# Patient Record
Sex: FEMALE | Race: WHITE | NOT HISPANIC OR LATINO | Employment: OTHER | URBAN - METROPOLITAN AREA
[De-identification: names, ages, dates, MRNs, and addresses within clinical notes are randomized per-mention and may not be internally consistent; named-entity substitution may affect disease eponyms.]

---

## 2024-06-27 ENCOUNTER — OFFICE VISIT (OUTPATIENT)
Dept: NEUROLOGY | Facility: HOSPITAL | Age: 34
End: 2024-06-27
Payer: COMMERCIAL

## 2024-06-27 ENCOUNTER — APPOINTMENT (OUTPATIENT)
Dept: NEUROLOGY | Facility: HOSPITAL | Age: 34
End: 2024-06-27

## 2024-06-27 VITALS
HEIGHT: 72 IN | RESPIRATION RATE: 16 BRPM | BODY MASS INDEX: 18.96 KG/M2 | WEIGHT: 140 LBS | SYSTOLIC BLOOD PRESSURE: 123 MMHG | HEART RATE: 52 BPM | DIASTOLIC BLOOD PRESSURE: 81 MMHG

## 2024-06-27 DIAGNOSIS — R20.0 NUMBNESS AND TINGLING: Primary | ICD-10-CM

## 2024-06-27 DIAGNOSIS — G90.A POTS (POSTURAL ORTHOSTATIC TACHYCARDIA SYNDROME): ICD-10-CM

## 2024-06-27 DIAGNOSIS — R20.2 NUMBNESS AND TINGLING: Primary | ICD-10-CM

## 2024-06-27 PROCEDURE — 99215 OFFICE O/P EST HI 40 MIN: CPT | Performed by: PSYCHIATRY & NEUROLOGY

## 2024-06-27 PROCEDURE — 99417 PROLNG OP E/M EACH 15 MIN: CPT | Performed by: PSYCHIATRY & NEUROLOGY

## 2024-06-27 PROCEDURE — 99205 OFFICE O/P NEW HI 60 MIN: CPT | Performed by: PSYCHIATRY & NEUROLOGY

## 2024-06-27 RX ORDER — DULOXETIN HYDROCHLORIDE 30 MG/1
30 CAPSULE, DELAYED RELEASE ORAL DAILY
COMMUNITY

## 2024-06-27 RX ORDER — OXYBUTYNIN CHLORIDE 5 MG/1
5 TABLET ORAL 2 TIMES DAILY
COMMUNITY

## 2024-06-27 RX ORDER — LIDOCAINE 50 MG/G
OINTMENT TOPICAL AS NEEDED
COMMUNITY

## 2024-06-27 RX ORDER — OMEPRAZOLE 20 MG/1
20 CAPSULE, DELAYED RELEASE ORAL
COMMUNITY

## 2024-06-27 RX ORDER — ONDANSETRON 4 MG/1
4 TABLET, ORALLY DISINTEGRATING ORAL EVERY 8 HOURS PRN
COMMUNITY

## 2024-06-27 RX ORDER — METOPROLOL SUCCINATE 25 MG/1
25 TABLET, EXTENDED RELEASE ORAL DAILY
COMMUNITY

## 2024-06-27 ASSESSMENT — PAIN SCALES - GENERAL: PAINLEVEL: 7

## 2024-06-27 NOTE — PATIENT INSTRUCTIONS
We need to do an extensive workup to find a cause for your autonomic dysfunction.     Please get your blood drawn and urine test.     Call [phone] to reach the Autonomic Testing/EMG scheduling staff.    We will try to arrange for a lip biopsy with ENT, looking for Sjogren's disease.

## 2024-06-27 NOTE — PROGRESS NOTES
[facility] AUTONOMIC PROGRAM         Nadia Mullins MD    Professor of Neurology  [facility]  Senior Attending Physician- [facility]  Director, Autonomic Program and Laboratories  Medical Director, [place] [address]  Office: [phone]  Assistant: Carmita Gibbs (email: [email or web address])       AUTONOMIC NERVOUS SYSTEM CONSULTATION    Patient Information     Medical Record Number: [id number]   YOB: 1990    Home Address: [address] [place] [id number]   Phone Number:  [phone]      Primary Care Physician: No primary care provider on file.    Referring Physician: No referring provider defined for this encounter.    Patient accompanied by:    In addition to attending physician, patient seen by: Tushar Luna MD    Clinical Scores    CLINICAL SCORES    [facility] 31 : 72      IMPRESSION:  Jyothi Genao presents with orthostatic intolerance since childhood along with numerous syncopal events.  Many of which are complex involving shaking and other abnormal behaviors.  She has undergone MRI and EEG she is reported normal.  She continues to have orthostatic lightheadedness along with syncope currently.  She also suffers from numerous other issues including sicca symptoms, migraines, gastroparesis, diarrhea and constipation alternating, celiac disease diagnosed by biopsy and by antibody, none length-dependent small fiber neuropathy diagnosed by skin biopsy (abnormal thigh segment, with normal calf segment), hypermobile EDS, mitral valve prolapse and frequent PVCs with palpitations on metoprolol, as well as other nonspecific symptoms.     She was then diagnosed with POTS by tilt table in [place] on, and was noted to be very hypertensive when upright, suggesting hyperadrenergic POTS.  Will pursue the remaining workup that is needed for her including  EMG, and lip biopsy looking for Sjogren's disease, along with other causes of POTS and dysautonomia.    She is here from out of town traveling from [facility] today, we will arrange the best we can for diagnostic testing and lip biopsy with ENT while she was in town.    PLAN/RECOMMENDATIONS:   Problem List Items Addressed This Visit       Numbness and tingling    -  Primary    Relevant Orders    Referral to ENT    EMG & nerve conduction    POTS (postural orthostatic tachycardia syndrome)        Relevant Orders    [place] DYS2 panel; [facility]; DYS2 - Miscellaneous Test    Thyroid Peroxidase (TPO) Antibody    C-Reactive Protein    Carnitine Panel, Blood    Catecholamines, Fractionated, Plasma    Anti-Thyroglobulin Antibody    RADHA + ERON Panel    Thyroxine, Total    Triiodothyronine, Free    Sedimentation Rate    Rheumatoid Factor    Serum Protein Electrophoresis + Immunofixation    Anti-Parietal Cell Antibody    Paraneoplastic Autoantibodies Evaluation    Mycoplasma Pneumoniae Antibody, IgM    Mycoplasma Pneumoniae Antibody, IgG    Homocysteine    Glucose Tolerance Test, 3 hour (Non-Pregnancy)    Cryoglobulin    Copper, Blood    Coenzyme Q10    Ceruloplasmin    Celiac Panel    Methylmalonic Acid    Aldolase    Vitamin B6    Vitamin E    Copper, Urine    Metanephrines, Fractionated, Urine (24 hour or Random)    Organic Acids, Urine    Porphobilinogen, 24 Hour Urine    Aminolevulinic Acid (ALA), Urine    Urine Protein Electrophoresis + Immunofixation    [place]/Lambda Free Light Chain, Serum    Voltage-Gated Calcium Channel P/Q type and N-type (VGCC) Antibody Panel; [facility]; 6834123 - Miscellaneous Test    Early Sjögren’s Syndrome Profile (SP-1, CA-6, PSP - IgG, IgA, IgM); [facility]); 76448 - Miscellaneous Test    Referral to ENT    Autonomic Testing     Tushar Luna MD  Neuromuscular Fellow        HPI    Jyothi Genao is a 34 y.o. y/o right}-handed /Turkmen/English  "female presenting to the  Autonomic Program for the evaluation of dysautonomia.     History details   PVCs in childhood. At age 12 years old she was said to have orthostatic hypotension.     About 10 years ago she started having fainting spells (blamed on electrolyte problems). Orthostatic hypotension.     Would collapse suddenly and tremor ing \"seizure like\". She would be confused and repeat words or non sense words. This occurring a few times per year. Slightly different each time -- blurring and blacking of vision, followed by . She gets burnt smells, or metallic taste in her mouth - occurring weekly.  She had a 20 min and 1 hour EEG negative. MRI brain normal.     Sometimes she gets full body tremoring with preserved consciousness.     She also has migraines, starting at 16 years old. Pain in the temples, up the back of the skull, behind eyes. Vice like/squeezing, better lying down. Photophobia/phonophobia, nausea. Constant pain (heaviness) over the shoulder - worse standing up. She has this daily. She has tried many meds.  Botox only helps a little.     Epilepsy workup negative, but has hypersomnia, RLS, parasomnia.     Recently hypertension (OH in past).     GI: Gastroparesis started at 11 yo, daily nausea, vomiting often. GERD, bloating, postprandial fatigue. Diarrhea and constipation. Up to 17 days without a bowel movement.     Celiac disease diagnosed (biopsy+ and Ab), tried gluten free and FODMAP, helped alittle.     Small fiber neuropathy - mild since childhood. She has numbness and tingling, pain, and coldness in her hands, feet, and sometimes face. It is symmetric. Skin biopsy: c/w SFN, (thigh ABN, calf NL). Worse in last couple years.     hEDS - hypermobile joints, paternal grandfather atypical marfan MV prolaspe.     Cardaic - MV prolapse with regurgitation, other valves too. Frequent PVCs off beta blocker.     Vertigo and hearing loss. ENT said she has an auditory processing disorder with     Voice " problems, she has poor volume of voice, and hoarseness. Sometimes losses voice.     Dry eye, mouth, grey teeth, and several cavities.     In 2019 tilt was normal with PVC    Tilt repeated 4/17/2024 and c/w POTS, however hypertensive when supine.     She has seen multiple neurologists. Right now just for migraine. Never seen an autonomic specialist. Cardiology for autonomic problems.     Recent labs:  B12 600s   B6 toxic high  Metabolic, CBC, A1c, iron studies, TSH normal  Ferritin high  Low folate    Relevant Past Medical History:  Extensive discussion above    Relevant Past Surgical History:  Palate surgeries for abnormal teeth  Tonsils out    Relevant Family History:   Mother has HTN and stroke  Father vitiligo     AUTONOMIC REVIEW OF SYSTEMS    COMPASS 31 for research purposes only (see below)  1 1. In the past year, have you ever felt faint, dizzy, “goofy”, or had difficulty thinking soon after standing up from a sitting or lying position? Yes   3 2. When standing up, how frequently do you get these feelings or symptoms? Almost Always   3 3. How would you rate the severity of these feelings or symptoms? Severe   1 4. In the past year, have these feelings or symptoms that you have experienced: Stayed about the same   8     32     1 5. In the past year, have you ever noticed color changes in your skin, such as red, white, or purple? (If you answer no, please skip to question 8) Yes   2 6. What parts of your body are affected by these color changes? Hands and Feet   2 7. Have these changes in your skin color: Gotten somewhat worse   5     4     1 8. In the past 5 years, what changes, if any, have occurred in your general body sweating? I sweat much more than I used to   1 9. Do your eyes feel excessively dry? Yes   1 10. Does you mouth feel excessively dry? Yes   1 11. For the symptom of dry eyes or dry mouth that you have had for the longest period of time, has this symptom: Stayed about the same   4     9     1  12. In the past year, have you noticed any changes in how quickly you get full when eating a meal? I get full more quickly now than I used to   2 13. In the past year, have you felt excessively full or persistently full (bloated feeling) after a meal? A lot of the time   2 14. In the past year, have you vomited after a meal? A lot of the time   2 15. In the past year, have you had a cramping or colicky abdominal pain? A lot of the time   1 16. In the past year, have you had any bouts of diarrhea? (If you answer no, please skip to question 20) Yes   2 17. How frequently does this occur? Frequently   2 18. How severe are these bouts of diarrhea? Moderate   1 19. Have your bouts of diarrhea gotten: Stayed about the same   1 20. In the past year, have you been constipated? (If you answer no, please skip to question 24) Yes   2 21. How frequently are you constipated? Frequently   3 22. How severe are these episodes of constipation? Severe   0 23. Has your constipation gotten: Gotten somewhat better   19     17     2 24. In the past year, have you ever lost control of your bladder function? Frequently   1 25. In the past year, have you had difficulty passing urine? Occasionally   2 26. In the past year, have you had trouble completely emptying your bladder? Frequently   5     6     3 27. In the past year, without sunglasses or tinted glasses, has bright light bothered your eyes? (If you amelia never, please skip to question 29) Constantly   3 28. How severe is this sensitivity to bright light? Severe   2 29. In the past year, have you had trouble focusing your eyes? (If you amelia never, please skip to question 31) Frequently   3 30. How severe is this focusing problem? Severe   2 31. Has the most troublesome symptom with your eyes (i.e. sensitivity to bright light or trouble focusing) gotten: Gotten somewhat worse   13     [id number]          TOTAL     72 /100           Additional Autonomic Review of  Systems    Genitourinary    a. Erectile dysfunction N/A  b. Ejaculation dysfunction N/A  c. Vaginal dryness N/A  d. Difficulty climaxing N/A    Autoimmune symptoms    a. Chronic joint pain Yes  b. Chronic skin disease Yes  c. Chronic muscle pain Yes    GENERAL EXAMINATION    Overall appearance: NAD, well-nourished, well-kempt, and pleasant  Extremities: normal    NEUROLOGICAL EXAMINATION    A. Cognition and Language  1. Patient is alert, oriented to time, place and persons  2. Language normal  3. Dysarthria: not present  4. Memory: no apparent deficit  5. MMSE: N/A    B. Cranial Nerves  1. Fundi: not examined  2. Olfaction: not tested  3. Eye movements: EOMI   4. Pupils: PERRLA  5. Facial sensation: normal  6. Facial motor: normal  7. Hearing: normal bilaterally  8. Palate: elevates symmetrically bilaterally  9. SCM: normal strength  10. Tongue: midline    C. Motor examination (MRC [0 to 5] or modified MRC [pluses and minuses] classification)  1. Neck flexion :   2. Neck extension:   3. Eye closure:      4. Lip closure:      5. Shoulder elevation:   Right  normal Left: normal  6. Deltoids:    Right:  5/5  Left: 5/5  7. Biceps:    Right: 5/5  Left: 5/5  8. Triceps:    Right: 5/5  Left: 5/5  9. Forearm pronation:   Right: 5/5 Left: 5/5  10. Forearm supination:   Right:  5/5 Left: 5/5  11. Wrist flexion:    Right: 5/5 Left: 5/5  12. Wrist extension:   Right : 5/5 Left: 5/5  13. Intrinsic Hand Muscles:  Right:  5/5 Left: 5/5  14.  Hip flexion   Right: 5/5 Left: 5/5  15.  Hip extension   Right: 5/5 Left: 5/5  16.  Knee flexion   Right: 5/5 Left: 5/5  17. Knee extension  Right: 5/5 Left: 5/5  18. Dorsiflexion   Right: 5/5 Left: 5/5  19.  Plantar flexion   Right: 5/5 Left: 5/5  20. Toe extension   Right: 5/5 Left: 5/5  21. Toe flexion   Right: 5/5 Left: 5/5      D. Sensory examination  In the upper extremity there is reduced pinprick over a patch of the lower arm and mid forearm with return to normal sensation in the  wrist, and allodynia in the hands and fingers bilaterally.  In the lower extremity there is patchy reduced sensation around the calf with return of normal sensation and allodynia in the feet bilaterally.  Proprioception and vibration sense is normal.    E. Reflexes (NINDS classification 0 to 4)    Biceps:   Right 3 Left 3  Triceps:   Right 3 Left 3  BR:    Right + Left +  Knee:    Right 3 Left 3  Ankles:   Right 3 Left 3    Additional reflexes if relevant (Absent or Present):     Babinski: down going bilateral     F. Coordination    F to N: Normal  H to S:  FRANKLIN:   FFM: Normal    G. Gait    Patient felt lightheaded upon standing-up from supine position: Yes    Normal gait  Normal base  Romberg test: Normal      Attending's Note:   I saw the patient with Dr. Luna. I agree with Dr. Luna's impression and recommendations/plan. We discussed management of this patient together and I would not have more to add to the above.     I spent 90 minutes with the patient, at least 50% of which were dedicated to education and detailing diagnostic plans and management.      Nadia Mullins MD

## 2024-07-01 ENCOUNTER — LAB (OUTPATIENT)
Dept: LAB | Facility: LAB | Age: 34
End: 2024-07-01
Payer: COMMERCIAL

## 2024-07-01 ENCOUNTER — HOSPITAL ENCOUNTER (OUTPATIENT)
Dept: NEUROLOGY | Facility: CLINIC | Age: 34
Discharge: HOME | End: 2024-07-01
Payer: COMMERCIAL

## 2024-07-01 DIAGNOSIS — R20.0 NUMBNESS AND TINGLING: ICD-10-CM

## 2024-07-01 DIAGNOSIS — G90.A POTS (POSTURAL ORTHOSTATIC TACHYCARDIA SYNDROME): ICD-10-CM

## 2024-07-01 DIAGNOSIS — R20.2 NUMBNESS AND TINGLING: ICD-10-CM

## 2024-07-01 LAB
CENTROMERE B AB SER-ACNC: <0.2 AI
CERULOPLASMIN SERPL-MCNC: 22.6 MG/DL (ref 20–60)
CHROMATIN AB SERPL-ACNC: <0.2 AI
CRP SERPL-MCNC: <0.1 MG/DL
DSDNA AB SER-ACNC: <1 IU/ML
ENA JO1 AB SER QL IA: <0.2 AI
ENA RNP AB SER IA-ACNC: 0.2 AI
ENA SCL70 AB SER QL IA: <0.2 AI
ENA SM AB SER IA-ACNC: <0.2 AI
ENA SM+RNP AB SER QL IA: <0.2 AI
ENA SS-A AB SER IA-ACNC: <0.2 AI
ENA SS-B AB SER IA-ACNC: <0.2 AI
ERYTHROCYTE [SEDIMENTATION RATE] IN BLOOD BY WESTERGREN METHOD: <1 MM/H (ref 0–20)
GLIADIN PEPTIDE IGA SER IA-ACNC: <1 U/ML
GLUCOSE 1H P 75 G GLC PO SERPL-MCNC: 117 MG/DL
GLUCOSE 2H P 75 G GLC PO SERPL-MCNC: 84 MG/DL
GLUCOSE 3H P 75 G GLC PO SERPL-MCNC: 76 MG/DL
GLUCOSE P FAST SERPL-MCNC: 83 MG/DL
HCYS SERPL-SCNC: 12.33 UMOL/L (ref 5–13.9)
PROT SERPL-MCNC: 7.6 G/DL (ref 6.4–8.2)
RHEUMATOID FACT SER NEPH-ACNC: <10 IU/ML (ref 0–15)
RIBOSOMAL P AB SER-ACNC: <0.2 AI
T3FREE SERPL-MCNC: 3.7 PG/ML (ref 2.3–4.2)
T4 SERPL-MCNC: 7.5 UG/DL (ref 4.5–11.1)
THYROPEROXIDASE AB SERPL-ACNC: 302 IU/ML
TTG IGA SER IA-ACNC: <1 U/ML

## 2024-07-01 PROCEDURE — 82951 GLUCOSE TOLERANCE TEST (GTT): CPT

## 2024-07-01 PROCEDURE — 83921 ORGANIC ACID SINGLE QUANT: CPT

## 2024-07-01 PROCEDURE — 86738 MYCOPLASMA ANTIBODY: CPT

## 2024-07-01 PROCEDURE — 82390 ASSAY OF CERULOPLASMIN: CPT

## 2024-07-01 PROCEDURE — 82542 COL CHROMOTOGRAPHY QUAL/QUAN: CPT

## 2024-07-01 PROCEDURE — 95912 NRV CNDJ TEST 11-12 STUDIES: CPT | Performed by: PSYCHIATRY & NEUROLOGY

## 2024-07-01 PROCEDURE — 84165 PROTEIN E-PHORESIS SERUM: CPT

## 2024-07-01 PROCEDURE — 86320 SERUM IMMUNOELECTROPHORESIS: CPT | Performed by: PSYCHIATRY & NEUROLOGY

## 2024-07-01 PROCEDURE — 86235 NUCLEAR ANTIGEN ANTIBODY: CPT

## 2024-07-01 PROCEDURE — 83516 IMMUNOASSAY NONANTIBODY: CPT

## 2024-07-01 PROCEDURE — 82595 ASSAY OF CRYOGLOBULIN: CPT

## 2024-07-01 PROCEDURE — 36415 COLL VENOUS BLD VENIPUNCTURE: CPT

## 2024-07-01 PROCEDURE — 83521 IG LIGHT CHAINS FREE EACH: CPT

## 2024-07-01 PROCEDURE — 95886 MUSC TEST DONE W/N TEST COMP: CPT | Performed by: PSYCHIATRY & NEUROLOGY

## 2024-07-01 PROCEDURE — 84436 ASSAY OF TOTAL THYROXINE: CPT

## 2024-07-01 PROCEDURE — 83090 ASSAY OF HOMOCYSTEINE: CPT

## 2024-07-01 PROCEDURE — 86140 C-REACTIVE PROTEIN: CPT

## 2024-07-01 PROCEDURE — 86038 ANTINUCLEAR ANTIBODIES: CPT

## 2024-07-01 PROCEDURE — 84207 ASSAY OF VITAMIN B-6: CPT

## 2024-07-01 PROCEDURE — 86431 RHEUMATOID FACTOR QUANT: CPT

## 2024-07-01 PROCEDURE — 84446 ASSAY OF VITAMIN E: CPT

## 2024-07-01 PROCEDURE — 86255 FLUORESCENT ANTIBODY SCREEN: CPT

## 2024-07-01 PROCEDURE — 84155 ASSAY OF PROTEIN SERUM: CPT

## 2024-07-01 PROCEDURE — 82525 ASSAY OF COPPER: CPT

## 2024-07-01 PROCEDURE — 86334 IMMUNOFIX E-PHORESIS SERUM: CPT

## 2024-07-01 PROCEDURE — 86225 DNA ANTIBODY NATIVE: CPT

## 2024-07-01 PROCEDURE — 86800 THYROGLOBULIN ANTIBODY: CPT

## 2024-07-01 PROCEDURE — 85652 RBC SED RATE AUTOMATED: CPT

## 2024-07-01 PROCEDURE — 84165 PROTEIN E-PHORESIS SERUM: CPT | Performed by: PSYCHIATRY & NEUROLOGY

## 2024-07-01 PROCEDURE — 82952 GTT-ADDED SAMPLES: CPT

## 2024-07-01 PROCEDURE — 83519 RIA NONANTIBODY: CPT

## 2024-07-01 PROCEDURE — 82384 ASSAY THREE CATECHOLAMINES: CPT

## 2024-07-01 PROCEDURE — 84481 FREE ASSAY (FT-3): CPT

## 2024-07-01 PROCEDURE — 82085 ASSAY OF ALDOLASE: CPT

## 2024-07-01 PROCEDURE — 86376 MICROSOMAL ANTIBODY EACH: CPT

## 2024-07-01 NOTE — PROGRESS NOTES
History of Present Illness    Jyothi Genao is a 34 y.o. female who is seen at the request of Dr. Solis for a biopsy of the minor salivary glands.  This is a patient comes from [facility].  She has had issues with Phyllis-Danlos syndrome, as well as a multitude of autoimmune conditions.  She does complain of dryness of her mouth and eyes.      Past Medical History    The past medical history is consistent with this autoimmune condition.  Her medications are documented in the chart.  She does have some medication that she cannot take because of her condition.  She does not smoke or drink.  She is here today with her   Physical Exam    The patient is alert and oriented. Examination of the external ears, ear canals, and eardrums, is within normal limits. Examination of the anterior and external nose is negative. Examination of the oral cavity and oropharynx is normal. There is no evidence of any mucosal lesions. There is good mobility of the tongue and palate. There is good mandibular excursion. Palpation of the parotid, neck, and thyroid field fails to show any worrisome masses or adenopathies.    After verbal consent and under Xylocaine 1% to 100,000 epinephrine an incision was made in the inner aspect of the right lower lip.  Multiple small salivary glands were immediately identified.  5 of them were harvested and sent to pathology.  The area was closed with 5-0 fast absorbing sutures.  This was well-tolerated    Assessment and Plan    Possible Sjogren's disease in the patient with the significant autoimmune disease.  Minor salivary gland biopsies were carried out today.  This was well-tolerated.  The patient was instructed in the care of the wound.    I will see her on a as needed basis.

## 2024-07-02 ENCOUNTER — LAB (OUTPATIENT)
Dept: LAB | Facility: LAB | Age: 34
End: 2024-07-02
Payer: COMMERCIAL

## 2024-07-02 ENCOUNTER — OFFICE VISIT (OUTPATIENT)
Dept: OTOLARYNGOLOGY | Facility: HOSPITAL | Age: 34
End: 2024-07-02
Payer: COMMERCIAL

## 2024-07-02 VITALS — TEMPERATURE: 97.3 F | WEIGHT: 140 LBS | HEIGHT: 72 IN | BODY MASS INDEX: 18.96 KG/M2

## 2024-07-02 DIAGNOSIS — G90.A POTS (POSTURAL ORTHOSTATIC TACHYCARDIA SYNDROME): ICD-10-CM

## 2024-07-02 DIAGNOSIS — R20.2 NUMBNESS AND TINGLING: ICD-10-CM

## 2024-07-02 DIAGNOSIS — R20.0 NUMBNESS AND TINGLING: ICD-10-CM

## 2024-07-02 DIAGNOSIS — M35.00 SJOGREN'S SYNDROME, WITH UNSPECIFIED ORGAN INVOLVEMENT (MULTI): Primary | ICD-10-CM

## 2024-07-02 LAB
KAPPA LC SERPL-MCNC: 1.6 MG/DL (ref 0.33–1.94)
KAPPA LC/LAMBDA SER: 1.28 {RATIO} (ref 0.26–1.65)
LAMBDA LC SERPL-MCNC: 1.25 MG/DL (ref 0.57–2.63)
PROT UR-ACNC: 4 MG/DL (ref 5–25)

## 2024-07-02 PROCEDURE — 99203 OFFICE O/P NEW LOW 30 MIN: CPT | Performed by: OTOLARYNGOLOGY

## 2024-07-02 PROCEDURE — 83918 ORGANIC ACIDS TOTAL QUANT: CPT

## 2024-07-02 PROCEDURE — 1036F TOBACCO NON-USER: CPT | Performed by: OTOLARYNGOLOGY

## 2024-07-02 PROCEDURE — 42405 BIOPSY OF SALIVARY GLAND: CPT | Performed by: OTOLARYNGOLOGY

## 2024-07-02 PROCEDURE — 81050 URINALYSIS VOLUME MEASURE: CPT | Performed by: PSYCHIATRY & NEUROLOGY

## 2024-07-02 PROCEDURE — 99213 OFFICE O/P EST LOW 20 MIN: CPT | Performed by: OTOLARYNGOLOGY

## 2024-07-02 PROCEDURE — 86335 IMMUNFIX E-PHORSIS/URINE/CSF: CPT | Performed by: PSYCHIATRY & NEUROLOGY

## 2024-07-02 PROCEDURE — 83835 ASSAY OF METANEPHRINES: CPT

## 2024-07-02 PROCEDURE — 84156 ASSAY OF PROTEIN URINE: CPT

## 2024-07-02 PROCEDURE — 82525 ASSAY OF COPPER: CPT

## 2024-07-02 PROCEDURE — 84110 ASSAY OF PORPHOBILINOGEN: CPT

## 2024-07-02 PROCEDURE — 82135 ASSAY AMINOLEVULINIC ACID: CPT

## 2024-07-02 RX ORDER — MAGNESIUM 250 MG
250 TABLET ORAL
COMMUNITY

## 2024-07-02 RX ORDER — SODIUM CHLORIDE 1000 MG
1 TABLET, SOLUBLE MISCELLANEOUS
COMMUNITY
Start: 2023-11-14

## 2024-07-02 RX ORDER — MIRABEGRON 25 MG/1
1 TABLET, FILM COATED, EXTENDED RELEASE ORAL DAILY
COMMUNITY
Start: 2022-09-29

## 2024-07-02 ASSESSMENT — PATIENT HEALTH QUESTIONNAIRE - PHQ9
2. FEELING DOWN, DEPRESSED OR HOPELESS: NOT AT ALL
1. LITTLE INTEREST OR PLEASURE IN DOING THINGS: NOT AT ALL
SUM OF ALL RESPONSES TO PHQ9 QUESTIONS 1 & 2: 0

## 2024-07-03 LAB
ALDOLASE SERPL-CCNC: 4.9 U/L (ref 1.2–7.6)
ANA SER QL HEP2 SUBST: NEGATIVE
COLLECT DURATION TIME SPEC: 24 HRS
COPPER SERPL-MCNC: 78.7 UG/DL (ref 80–155)
GLIADIN PEPTIDE IGG SER IA-ACNC: <0.56 FLU (ref 0–4.99)
PCA IGG SER-ACNC: 34.4 UNITS (ref 0–24.9)
PROT 24H UR-MCNC: 4 MG/DL (ref 5–24)
SPECIMEN VOL 24H UR: 2700 ML
THYROGLOB AB SERPL-ACNC: 2.2 IU/ML (ref 0–4)
TOTAL PROTEIN (MG/24HR) IN 24 HOUR URINE UPE: 108 MG/24H
TTG IGG SER IA-ACNC: <0.82 FLU (ref 0–4.99)

## 2024-07-04 LAB
A-TOCOPHEROL VIT E SERPL-MCNC: 8.7 MG/L (ref 5.5–18)
ALBUMIN: 4.9 G/DL (ref 3.4–5)
ALPHA 1 GLOBULIN: 0.3 G/DL (ref 0.2–0.6)
ALPHA 2 GLOBULIN: 0.6 G/DL (ref 0.4–1.1)
BETA GLOBULIN: 0.7 G/DL (ref 0.5–1.2)
BETA+GAMMA TOCOPHEROL SERPL-MCNC: 1.5 MG/L (ref 0–6)
GAMMA GLOBULIN: 1.1 G/DL (ref 0.5–1.4)
IMMUNOFIXATION COMMENT: NORMAL
M PNEUMO IGG SER IA-ACNC: 0.38 U/L
M PNEUMO IGM SER IA-ACNC: 2.17 U/L
METHYLMALONATE SERPL-SCNC: <0.1 UMOL/L (ref 0–0.4)
PATH REVIEW - SERUM IMMUNOFIXATION: NORMAL
PATH REVIEW-SERUM PROTEIN ELECTROPHORESIS: NORMAL
PROTEIN ELECTROPHORESIS COMMENT: NORMAL

## 2024-07-05 LAB
3OH-DODECANOYLCARN SERPL-SCNC: 0.01 UMOL/L
3OH-ISOVALERYLCARN SERPL-SCNC: <0.01 UMOL/L
3OH-LINOLEOYLCARN SERPL-SCNC: <0.01 UMOL/L
3OH-OLEOYLCARN SERPL-SCNC: <0.01 UMOL/L
3OH-PALMITOLEYLCARN SERPL-SCNC: <0.01 UMOL/L
3OH-PALMITOYLCARN SERPL-SCNC: <0.01 UMOL/L
3OH-STEAROYLCARN SERPL-SCNC: <0.01 UMOL/L
3OH-TDECANOYLCARN SERPL-SCNC: <0.01 UMOL/L
3OH-TDECENOYLCARN SERPL-SCNC: 0.01 UMOL/L
ACETYLCARN SERPL-SCNC: 6.78 UMOL/L (ref 2.93–15.06)
ACYLCARNITINE PATTERN SERPL-IMP: NORMAL
BUTYRYL+ISOBUTYRYLCARN SERPL-SCNC: 0.13 UMOL/L
CARN ESTERS SERPL-SCNC: 6 UMOL/L (ref 5–29)
CARN ESTERS/C0 SERPL-SRTO: 0.2 RATIO (ref 0.1–1)
CARNITINE FREE SERPL-SCNC: 35 UMOL/L (ref 25–60)
CARNITINE SERPL-SCNC: 41 UMOL/L (ref 34–86)
COLLECT DURATION TIME SPEC: 24 HR
COLLECT DURATION TIME SPEC: 24 HR
CREAT 24H UR-MRATE: 1269 MG/D (ref 700–1600)
CREAT 24H UR-MRATE: 1269 MG/D (ref 700–1600)
CREAT UR-MCNC: 47 MG/DL
CREAT UR-MCNC: 47 MG/DL
D-ALA 24H UR-SRATE: 19 UMOL/D (ref 0–60)
D-ALA UR-SCNC: 7 UMOL/L (ref 0–35)
DECANOYLCARN SERPL-SCNC: 0.19 UMOL/L
DECENOYLCARN SERPL-SCNC: 0.14 UMOL/L
DODECANOYLCARN SERPL-SCNC: 0.05 UMOL/L
DODECENOYLCARN SERPL-SCNC: 0.07 UMOL/L
GLUTARYLCARN SERPL-SCNC: 0.14 UMOL/L
HEXANOYLCARN SERPL-SCNC: 0.05 UMOL/L
ISOVALERYL+MEBUTYRYLCARN SERPL-SCNC: 0.07 UMOL/L
LINOLEOYLCARN SERPL-SCNC: 0.04 UMOL/L
METANEPH 24H UR-MCNC: 62 UG/L
METANEPH 24H UR-MRATE: 167 UG/D (ref 36–229)
METANEPH+NORMETANEPH UR-IMP: NORMAL
METANEPH/CREAT 24H UR: 132 UG/G CRT (ref 0–300)
NORMETANEPHRINE 24H UR-MCNC: 113 UG/L
NORMETANEPHRINE 24H UR-MRATE: 305 UG/D (ref 95–650)
NORMETANEPHRINE/CREAT 24H UR: 240 UG/G CRT (ref 0–400)
OCTANOYLCARN SERPL-SCNC: 0.11 UMOL/L
OCTENOYLCARN SERPL-SCNC: 0.15 UMOL/L
OLEOYLCARN SERPL-SCNC: 0.08 UMOL/L
PALMITOLEYLCARN SERPL-SCNC: 0.02 UMOL/L
PALMITOYLCARN SERPL-SCNC: 0.06 UMOL/L
PROPIONYLCARN SERPL-SCNC: 0.25 UMOL/L
SPECIMEN VOL ?TM UR: 2700 ML
SPECIMEN VOL ?TM UR: 2700 ML
STEAROYLCARN SERPL-SCNC: 0.02 UMOL/L
TDECADIENOYLCARN SERPL-SCNC: 0.03 UMOL/L
TDECANOYLCARN SERPL-SCNC: 0.02 UMOL/L
TDECENOYLCARN SERPL-SCNC: 0.07 UMOL/L

## 2024-07-06 LAB
ALBUMIN MFR UR ELPH: 49.9 %
ALPHA1 GLOB MFR UR ELPH: 9.8 %
ALPHA2 GLOB MFR UR ELPH: 13 %
B-GLOBULIN MFR UR ELPH: 14.5 %
CRYOGLOB SER QL: NORMAL
GAMMA GLOB MFR UR ELPH: 12.8 %
IMMUNOFIXATION COMMENT: NORMAL
PATH REVIEW - URINE IMMUNOFIXATION: NORMAL
PATH REVIEW-URINE PROTEIN ELECTROPHORESIS: NORMAL
SCAN RESULT: NORMAL
URINE ELECTROPHORESIS COMMENT: NORMAL

## 2024-07-07 LAB — PYRIDOXAL PHOS SERPL-SCNC: 30.8 NMOL/L (ref 20–125)

## 2024-07-08 LAB
2OXO3ME-VALERATE/CREAT UR-SRTO: NOT DETECTED (ref 0–10)
2OXOISOCAPROATE/CREAT UR-SRTO: NOT DETECTED (ref 0–4)
2OXOISOVALERATE/CREAT UR-SRTO: NOT DETECTED (ref 0–4)
4OH-PHENYLACETATE/CREAT UR-SRTO: 14 (ref 0–25)
4OH-PHENYLLACTATE/CREAT UR-SRTO: NOT DETECTED (ref 0–4)
4OH-PHENYLPYRUVATE/CREAT UR-SRTO: NOT DETECTED (ref 0–2)
A-KETOGLUT/CREAT UR-SRTO: 25 (ref 0–75)
ACETOACET/CREAT UR-SRTO: 1 (ref 0–4)
ADIPATE/CREAT UR-SRTO: 2 (ref 0–35)
B-OH-BUTYR/CREAT UR-SRTO: 1 (ref 0–4)
CREAT UR-MCNC: 48 MG/DL
DOPAMINE SERPL-MCNC: <30 PG/ML (ref 0–48)
EPINEPH PLAS-MCNC: 31 PG/ML (ref 0–62)
ETHYLMALONATE/CREAT UR-SRTO: 1 (ref 0–4)
FUMARATE/CREAT UR-SRTO: NOT DETECTED (ref 0–4)
LACTATE/CREAT UR-SRTO: 42 (ref 0–50)
METHYLMALONATE/CREAT UR-SRTO: 1 (ref 0–5)
NOREPINEPH PLAS-MCNC: 784 PG/ML (ref 0–874)
ORGANIC ACIDS PATTERN UR-IMP: ABNORMAL
PYRUVATE/CREAT UR-SRTO: 24 (ref 0–15)
SCAN RESULT: NORMAL
SEBACATE/CREAT UR-SRTO: NOT DETECTED (ref 0–3)
SUBERATE/CREAT UR-SRTO: 1 (ref 0–3)
SUCCINATE/CREAT UR-SRTO: 5 (ref 0–20)
SUCCINYLACETONE/CREAT UR-SRTO: NOT DETECTED (ref 0–0)

## 2024-07-09 LAB
AMPHIPHYSIN IGG SER QL IA: NEGATIVE
ANNOTATION COMMENT IMP: NORMAL
CV2 AB SERPL QL IF: NEGATIVE
GLIAL NUC TYPE 1 AB SER QL IF: NEGATIVE
HU1 AB SER QL: NEGATIVE
HU2 AB SER QL IF: NEGATIVE
HU3 AB SER QL: NEGATIVE
PARANEOPLASTIC AB SER-IMP: NORMAL
PCA-1 AB SER QL IF: NEGATIVE
PCA-2 AB SER QL IF: NEGATIVE
PCA-TR AB SER QL IF: NEGATIVE
VGCC-P/Q BIND IGG+IGM SER IA-SCNC: 0 NMOL/L
VGKC IGG+IGM SER IA-SCNC: 0 NMOL/L

## 2024-07-10 LAB
COPPER 24H UR-MRATE: 14 UG/24 HR (ref 3–35)
COPPER UR-MCNC: 5 UG/L
COPPER/CREAT UR: 10 UG/G CREAT (ref 0–49)
CREAT UR-MCNC: 0.48 G/L (ref 0.3–3)
LABORATORY COMMENT REPORT: NORMAL
PATH REPORT.FINAL DX SPEC: NORMAL
PATH REPORT.GROSS SPEC: NORMAL
PATH REPORT.RELEVANT HX SPEC: NORMAL
PATH REPORT.TOTAL CANCER: NORMAL
PBG 24H UR-MCNC: 0.4 MG/L (ref 0–2)
PBG 24H UR-MRATE: 1.1 MG/24 HR (ref 0–1.5)
SCAN RESULT: NORMAL

## 2024-07-19 LAB — SCAN RESULT: NORMAL

## 2024-12-05 ENCOUNTER — TELEMEDICINE (OUTPATIENT)
Dept: NEUROLOGY | Facility: HOSPITAL | Age: 34
End: 2024-12-05
Payer: COMMERCIAL

## 2024-12-05 DIAGNOSIS — G90.A POTS (POSTURAL ORTHOSTATIC TACHYCARDIA SYNDROME): Primary | ICD-10-CM

## 2024-12-05 DIAGNOSIS — G90.89 AUTOIMMUNE AUTONOMIC NEUROPATHY: ICD-10-CM

## 2024-12-05 PROCEDURE — 99214 OFFICE O/P EST MOD 30 MIN: CPT | Performed by: PSYCHIATRY & NEUROLOGY

## 2024-12-05 PROCEDURE — 99214 OFFICE O/P EST MOD 30 MIN: CPT | Mod: 95 | Performed by: PSYCHIATRY & NEUROLOGY

## 2024-12-05 NOTE — PROGRESS NOTES
[facility] AUTONOMIC PROGRAM    AUTONOMIC FOLLOW-UP VISIT      Nadia Mullins MD  Professor of Neurology  [facility]  Senior Attending Physician- [facility] [place]  Director, Autonomic Program  Medical Director, [facility] [place] [address]  Office: [phone]  Assistant: Carmita Gibbs (email: [email or web address])     Patient Information     Medical Record Number: [id number]   YOB: 1990    Home Address: [address] [place] [address]   Phone Number:  [phone]      Primary Care Physician: No primary care provider on file.    Referring Physician: No referring provider defined for this encounter.    Patient accompanied by: N/A  In addition to attending physician, patient seen by: CHARITO Chauhan    Clinical Scores    Previous Compass 31 (date 6/27/24): 72     IMPRESSION:  Jyothi Genao is a 34 year old female with a past medical history of POTS confirmed on tilt table test in [place], celiac, syncope, migraines, gastroparesis, and small fiber neuropathy.    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     Status of the symptoms:  Following with her specialists in [place]. She is still having a lot of symptoms; very fatigued and difficulty sleeping. Still having presyncope or near syncope events. Has a Rheumatology visit scheduled in a few months. Working with genetics. Planning to see functional medicine for EDS.     Findings on testing:   Anti TPO elevated  Myco IgG 380; Myco IgM 2170  Anti-Parietal Ab positive   Pyruvate slightly high   Positive SP-1IgM, CA-VI IgG and PSP IgM (early sjogrens panel)  Lip biopsy negative     PLAN/RECOMMENDATIONS:   -get Schirmer's test to assess for Sjogren's  -First line recommended treatment: Start IVIG   -Second  line: IV methylprednisolone   -Find collaborating physician to oversee IVIG and IV infusion near home     HPI    Jyothi Genao is a 34 y.o. y/o presenting to the  Autonomic Program for a follow-up on autonomic work-up. This is follow-up visit 1    Interval History details   See impression    Initial History  History of orthostatic intolerance since childhood along with numerous syncopal events.  Many of which are complex involving shaking and other abnormal behaviors. History including sicca symptoms, migraines, gastroparesis, diarrhea and constipation alternating, celiac disease diagnosed by biopsy and by antibody, none length-dependent small fiber neuropathy diagnosed by skin biopsy (abnormal thigh segment, with normal calf segment), hypermobile EDS, mitral valve prolapse and frequent PVCs with palpitations on metoprolol. Diagnosed with POTS by tilt table in [place], noted to be very hypertensive when upright, suggesting hyperadrenergic POTS.      I spent 40 minutes with the patient, at least 50% of which were spent on treatment management and education.    Kash Jones, APRN-CNP    Nadia Mullins MD